# Patient Record
Sex: MALE | Race: WHITE | NOT HISPANIC OR LATINO | URBAN - METROPOLITAN AREA
[De-identification: names, ages, dates, MRNs, and addresses within clinical notes are randomized per-mention and may not be internally consistent; named-entity substitution may affect disease eponyms.]

---

## 2018-04-23 ENCOUNTER — NEW PATIENT (OUTPATIENT)
Dept: URBAN - METROPOLITAN AREA CLINIC 26 | Facility: CLINIC | Age: 38
End: 2018-04-23
Payer: COMMERCIAL

## 2018-04-23 DIAGNOSIS — H47.213 PRIMARY OPTIC ATROPHY, BILATERAL: Primary | ICD-10-CM

## 2018-04-23 DIAGNOSIS — H52.03 HYPERMETROPIA, BILATERAL: ICD-10-CM

## 2018-04-23 PROCEDURE — 92004 COMPRE OPH EXAM NEW PT 1/>: CPT | Performed by: OPTOMETRIST

## 2018-04-23 PROCEDURE — 92015 DETERMINE REFRACTIVE STATE: CPT | Performed by: OPTOMETRIST

## 2018-04-23 ASSESSMENT — VISUAL ACUITY
OD: 20/20
OS: 20/20

## 2018-04-23 ASSESSMENT — INTRAOCULAR PRESSURE
OD: 15
OS: 12

## 2018-04-23 ASSESSMENT — KERATOMETRY
OS: 44.25
OD: 43.88

## 2022-03-22 ENCOUNTER — OFFICE VISIT (OUTPATIENT)
Dept: URBAN - METROPOLITAN AREA CLINIC 10 | Facility: CLINIC | Age: 42
End: 2022-03-22
Payer: COMMERCIAL

## 2022-03-22 DIAGNOSIS — H52.4 PRESBYOPIA: ICD-10-CM

## 2022-03-22 PROCEDURE — 92004 COMPRE OPH EXAM NEW PT 1/>: CPT | Performed by: STUDENT IN AN ORGANIZED HEALTH CARE EDUCATION/TRAINING PROGRAM

## 2022-03-22 ASSESSMENT — VISUAL ACUITY
OD: 20/20
OS: 20/20

## 2022-03-22 ASSESSMENT — KERATOMETRY
OS: 44.38
OD: 43.75

## 2022-03-22 ASSESSMENT — INTRAOCULAR PRESSURE
OD: 16
OS: 16

## 2022-03-22 NOTE — IMPRESSION/PLAN
Impression: Primary optic atrophy, bilateral Plan: Patient has Hx of optic neuritis OU which started in OS. Patient underweant treatment with IV steroids. No official diagnosis of MS. 
ONH healthy in appearance today.  Monitor annually

## 2022-03-22 NOTE — IMPRESSION/PLAN
Impression: Presbyopia: H52.4. Plan: Patient educated on condition, updated and released SRx for computer. Monitor.